# Patient Record
Sex: MALE | Race: OTHER | NOT HISPANIC OR LATINO | URBAN - METROPOLITAN AREA
[De-identification: names, ages, dates, MRNs, and addresses within clinical notes are randomized per-mention and may not be internally consistent; named-entity substitution may affect disease eponyms.]

---

## 2019-03-15 PROBLEM — Z00.00 ENCOUNTER FOR PREVENTIVE HEALTH EXAMINATION: Status: ACTIVE | Noted: 2019-03-15

## 2019-03-18 ENCOUNTER — OUTPATIENT (OUTPATIENT)
Dept: OUTPATIENT SERVICES | Facility: HOSPITAL | Age: 64
LOS: 1 days | Discharge: HOME | End: 2019-03-18

## 2019-03-18 ENCOUNTER — APPOINTMENT (OUTPATIENT)
Dept: HEMATOLOGY ONCOLOGY | Facility: CLINIC | Age: 64
End: 2019-03-18

## 2019-03-18 DIAGNOSIS — C90.00 MULTIPLE MYELOMA NOT HAVING ACHIEVED REMISSION: ICD-10-CM

## 2019-04-12 ENCOUNTER — APPOINTMENT (OUTPATIENT)
Dept: HEMATOLOGY ONCOLOGY | Facility: CLINIC | Age: 64
End: 2019-04-12
Payer: COMMERCIAL

## 2019-04-12 ENCOUNTER — RESULT REVIEW (OUTPATIENT)
Age: 64
End: 2019-04-12

## 2019-04-12 ENCOUNTER — APPOINTMENT (OUTPATIENT)
Dept: HEMATOLOGY ONCOLOGY | Facility: CLINIC | Age: 64
End: 2019-04-12

## 2019-04-12 ENCOUNTER — OUTPATIENT (OUTPATIENT)
Dept: OUTPATIENT SERVICES | Facility: HOSPITAL | Age: 64
LOS: 1 days | End: 2019-04-12
Payer: COMMERCIAL

## 2019-04-12 DIAGNOSIS — Z80.3 FAMILY HISTORY OF MALIGNANT NEOPLASM OF BREAST: ICD-10-CM

## 2019-04-12 DIAGNOSIS — Z78.9 OTHER SPECIFIED HEALTH STATUS: ICD-10-CM

## 2019-04-12 DIAGNOSIS — I10 ESSENTIAL (PRIMARY) HYPERTENSION: ICD-10-CM

## 2019-04-12 DIAGNOSIS — C90.00 MULTIPLE MYELOMA NOT HAVING ACHIEVED REMISSION: ICD-10-CM

## 2019-04-12 DIAGNOSIS — N18.9 CHRONIC KIDNEY DISEASE, UNSPECIFIED: ICD-10-CM

## 2019-04-12 LAB
BASOPHILS # BLD AUTO: 0 K/UL — SIGNIFICANT CHANGE UP (ref 0–0.2)
BASOPHILS NFR BLD AUTO: 0.1 % — SIGNIFICANT CHANGE UP (ref 0–2)
EOSINOPHIL # BLD AUTO: 0.1 K/UL — SIGNIFICANT CHANGE UP (ref 0–0.5)
EOSINOPHIL NFR BLD AUTO: 1.8 % — SIGNIFICANT CHANGE UP (ref 0–6)
HCT VFR BLD CALC: 38.1 % — LOW (ref 39–50)
HGB BLD-MCNC: 12.1 G/DL — LOW (ref 13–17)
LYMPHOCYTES # BLD AUTO: 1.5 K/UL — SIGNIFICANT CHANGE UP (ref 1–3.3)
LYMPHOCYTES # BLD AUTO: 31.5 % — SIGNIFICANT CHANGE UP (ref 13–44)
MCHC RBC-ENTMCNC: 26.2 PG — LOW (ref 27–34)
MCHC RBC-ENTMCNC: 31.8 G/DL — LOW (ref 32–36)
MCV RBC AUTO: 82.3 FL — SIGNIFICANT CHANGE UP (ref 80–100)
MONOCYTES # BLD AUTO: 0.6 K/UL — SIGNIFICANT CHANGE UP (ref 0–0.9)
MONOCYTES NFR BLD AUTO: 12.6 % — SIGNIFICANT CHANGE UP (ref 2–14)
NEUTROPHILS # BLD AUTO: 2.5 K/UL — SIGNIFICANT CHANGE UP (ref 1.8–7.4)
NEUTROPHILS NFR BLD AUTO: 54 % — SIGNIFICANT CHANGE UP (ref 43–77)
PLATELET # BLD AUTO: 159 K/UL — SIGNIFICANT CHANGE UP (ref 150–400)
RBC # BLD: 4.63 M/UL — SIGNIFICANT CHANGE UP (ref 4.2–5.8)
RBC # FLD: 13.8 % — SIGNIFICANT CHANGE UP (ref 10.3–14.5)
WBC # BLD: 4.7 K/UL — SIGNIFICANT CHANGE UP (ref 3.8–10.5)
WBC # FLD AUTO: 4.7 K/UL — SIGNIFICANT CHANGE UP (ref 3.8–10.5)

## 2019-04-12 PROCEDURE — 88360 TUMOR IMMUNOHISTOCHEM/MANUAL: CPT | Mod: 26

## 2019-04-12 PROCEDURE — 88271 CYTOGENETICS DNA PROBE: CPT

## 2019-04-12 PROCEDURE — 88313 SPECIAL STAINS GROUP 2: CPT

## 2019-04-12 PROCEDURE — 85097 BONE MARROW INTERPRETATION: CPT

## 2019-04-12 PROCEDURE — 88285 CHROMOSOME COUNT ADDITIONAL: CPT

## 2019-04-12 PROCEDURE — 88305 TISSUE EXAM BY PATHOLOGIST: CPT | Mod: 26

## 2019-04-12 PROCEDURE — 88313 SPECIAL STAINS GROUP 2: CPT | Mod: 26

## 2019-04-12 PROCEDURE — 88360 TUMOR IMMUNOHISTOCHEM/MANUAL: CPT

## 2019-04-12 PROCEDURE — 88305 TISSUE EXAM BY PATHOLOGIST: CPT

## 2019-04-12 PROCEDURE — 88187 FLOWCYTOMETRY/READ 2-8: CPT

## 2019-04-12 PROCEDURE — 88264 CHROMOSOME ANALYSIS 20-25: CPT

## 2019-04-12 PROCEDURE — 88237 TISSUE CULTURE BONE MARROW: CPT

## 2019-04-12 PROCEDURE — 88291 CYTO/MOLECULAR REPORT: CPT

## 2019-04-12 PROCEDURE — 38222 DX BONE MARROW BX & ASPIR: CPT | Mod: RT

## 2019-04-12 PROCEDURE — 99205 OFFICE O/P NEW HI 60 MIN: CPT | Mod: 25

## 2019-04-12 PROCEDURE — 88275 CYTOGENETICS 100-300: CPT

## 2019-04-12 PROCEDURE — 88280 CHROMOSOME KARYOTYPE STUDY: CPT

## 2019-04-12 PROCEDURE — 88185 FLOWCYTOMETRY/TC ADD-ON: CPT

## 2019-04-12 PROCEDURE — 88184 FLOWCYTOMETRY/ TC 1 MARKER: CPT

## 2019-04-12 PROCEDURE — 87205 SMEAR GRAM STAIN: CPT

## 2019-04-15 LAB — TM INTERPRETATION: SIGNIFICANT CHANGE UP

## 2019-04-16 LAB — HEMATOPATHOLOGY REPORT: SIGNIFICANT CHANGE UP

## 2019-04-19 ENCOUNTER — APPOINTMENT (OUTPATIENT)
Dept: NUCLEAR MEDICINE | Facility: IMAGING CENTER | Age: 64
End: 2019-04-19

## 2019-04-19 LAB — CHROM ANALY INTERPHASE BLD FISH-IMP: SIGNIFICANT CHANGE UP

## 2019-04-25 ENCOUNTER — FORM ENCOUNTER (OUTPATIENT)
Age: 64
End: 2019-04-25

## 2019-04-26 ENCOUNTER — OUTPATIENT (OUTPATIENT)
Dept: OUTPATIENT SERVICES | Facility: HOSPITAL | Age: 64
LOS: 1 days | End: 2019-04-26
Payer: COMMERCIAL

## 2019-04-26 ENCOUNTER — APPOINTMENT (OUTPATIENT)
Dept: NUCLEAR MEDICINE | Facility: IMAGING CENTER | Age: 64
End: 2019-04-26
Payer: COMMERCIAL

## 2019-04-26 DIAGNOSIS — C90.00 MULTIPLE MYELOMA NOT HAVING ACHIEVED REMISSION: ICD-10-CM

## 2019-04-26 PROCEDURE — 78816 PET IMAGE W/CT FULL BODY: CPT

## 2019-04-26 PROCEDURE — A9552: CPT

## 2019-04-26 PROCEDURE — 78816 PET IMAGE W/CT FULL BODY: CPT | Mod: 26,PI

## 2019-04-29 LAB — CHROM ANALY OVERALL INTERP SPEC-IMP: SIGNIFICANT CHANGE UP

## 2019-04-30 ENCOUNTER — OUTPATIENT (OUTPATIENT)
Dept: OUTPATIENT SERVICES | Facility: HOSPITAL | Age: 64
LOS: 1 days | Discharge: HOME | End: 2019-04-30

## 2019-04-30 DIAGNOSIS — C90.00 MULTIPLE MYELOMA NOT HAVING ACHIEVED REMISSION: ICD-10-CM

## 2019-05-03 ENCOUNTER — APPOINTMENT (OUTPATIENT)
Dept: HEMATOLOGY ONCOLOGY | Facility: CLINIC | Age: 64
End: 2019-05-03
Payer: COMMERCIAL

## 2019-05-03 VITALS
DIASTOLIC BLOOD PRESSURE: 80 MMHG | OXYGEN SATURATION: 97 % | RESPIRATION RATE: 16 BRPM | TEMPERATURE: 98.4 F | SYSTOLIC BLOOD PRESSURE: 144 MMHG | HEART RATE: 50 BPM | WEIGHT: 149.47 LBS

## 2019-05-03 DIAGNOSIS — C90.00 MULTIPLE MYELOMA NOT HAVING ACHIEVED REMISSION: ICD-10-CM

## 2019-05-03 PROCEDURE — 99215 OFFICE O/P EST HI 40 MIN: CPT

## 2019-08-05 PROBLEM — I10 HTN (HYPERTENSION), BENIGN: Status: ACTIVE | Noted: 2019-08-05

## 2019-08-05 PROBLEM — C90.00 MULTIPLE MYELOMA: Status: ACTIVE | Noted: 2019-04-11

## 2019-08-05 PROBLEM — Z78.9 DOES NOT USE TOBACCO: Status: ACTIVE | Noted: 2019-08-05

## 2019-08-05 PROBLEM — N18.9 CHRONIC KIDNEY DISEASE: Status: ACTIVE | Noted: 2019-08-05

## 2019-08-05 PROBLEM — Z80.3 FAMILY HISTORY OF MALIGNANT NEOPLASM OF BREAST: Status: ACTIVE | Noted: 2019-08-05

## 2019-08-05 RX ORDER — LISINOPRIL 20 MG/1
TABLET ORAL
Refills: 0 | Status: ACTIVE | COMMUNITY

## 2019-08-05 RX ORDER — MIRTAZAPINE 30 MG/1
TABLET, FILM COATED ORAL
Refills: 0 | Status: ACTIVE | COMMUNITY

## 2019-08-05 NOTE — ADDENDUM
[FreeTextEntry1] : Documented by Asuncion Michaels acting as a scribe for Dr. Menezes on 5/3/2019\par \par All medical record entries made by the Scribe were at my, Dr. Menezes's, direction and\par personally dictated by me on 5/3/2019 I have reviewed the chart and agree that the record\par accurately reflects my personal performance of the history, physical exam, procedure and imaging.

## 2019-08-05 NOTE — REVIEW OF SYSTEMS
[Fatigue] : fatigue [Recent Change In Weight] : ~T recent weight change [Negative] : Allergic/Immunologic [FreeTextEntry2] : -20lbs

## 2019-08-05 NOTE — ADDENDUM
[FreeTextEntry1] : Documented by Asuncion Michaels acting as a scribe for Dr. Menezes on 4/12/2019\par \par All medical record entries made by the Scribe were at my, Dr. Menezes's, direction and\par personally dictated by me on 4/12/2019 I have reviewed the chart and agree that the record\par accurately reflects my personal performance of the history, physical exam, procedure and imaging.

## 2019-08-05 NOTE — PROCEDURE
[Bone Marrow Aspiration] : bone marrow aspiration  [Bone Marrow Biopsy] : bone marrow biopsy [Patient] : the patient [Verbal Consent Obtained] : verbal consent was obtained prior to the procedure [Patient identification verified] : patient identification verified [Procedure verified and consent obtained] : procedure verified and consent obtained [Laterality verified and correct site marked] : laterality verified and correct site marked [Right] : site: right [Correct positioning] : correct positioning [Correct implant and/ or special equipment obtained] : correct impact and/ or special equipment obtained [Prone] : prone [Superior iliac spine was identified] : the superior iliac spine was identified. [The right posterior iliac crest was prepped with betadine and draped, using sterile technique.] : The right posterior iliac crest was prepped with betadine and draped, using sterile technique. [Aspirate] : aspirate [Cytogenetics] : cytogenetics [FISH] : FISH [Biopsy] : biopsy [Flow Cytometry] : flow cytometry [] : The patient was instructed to remove the bandage the following AM. The patient may bathe. Acetaminophen may be taken for discomfort, as per package directions.If there are any other problems, the patient was instructed to call the office. The patient verbalized understanding, and is aware of the office contact numbers. [FreeTextEntry1] : multiple myeloma

## 2019-08-05 NOTE — ASSESSMENT
[FreeTextEntry1] : Mr. Glynn is a 62 y/o male who presents for an evaluation of multiple myeloma.  Lab work completed as an outpatient last month- Immunofixation revealed one IgG Kappa band and one weak free Kappa light chain identified but with a IgG level of 3166, k/l FLC ratio of 114.  \par He has a history of CKD stage III, with a worsening creatinine, last 2.16.  \par I have had an extensive discussion with him and his wife.  I have explained he likely has underlying multiple myeloma (his k/l FLC ratio of >100 is diagnostic of disease).  I have explained that diagnostic testing would include a bone marrow biopsy along with metastatic skeletal survey or PET/CT to evaluate for underlying bone lesions.  \par I have has a long discussion regarding diagnosis/prognosis.  Explained that the disease is incurable but treatable.  Treatment consists of chemotherapy.  \par Bone marrow biopsy will be completed today.  PET/CT to be scheduled.  He will follow up after the above to discuss further treatment options as he has doubts about the diagnosis.  \par Check CMP today, repeat SPEP/JERROD, quantitative immunoglobulins/SFLC.\par

## 2019-08-05 NOTE — ASSESSMENT
[FreeTextEntry1] : Mr. Glynn is a 62 y/o male with IgG kappa multiple myeloma.  Bone marrow biopsy confirmed the diagnosis, normal karyotype, FISH 4 copies of CCND.  PET/CT revealed multiple bone lesions, his k/l ratio >100. \par I have had an extensive discussion with the patient and his wife regarding the diagnosis.  Explained that it is incurable and that treatment would entail chemotherapy.  Recommend to begin treatment with velcade/cytoxan/decadron, 3 weeks on/1 week off due to his renal failure.  Risks including but not limited to myelosuppression, infection, peripheral neuropathy, zoster reactivation discussed.  \par I have also recommended monthly bisphosphonate treatment with zometa/aredia with a dental evaluation prior. \par He appears very anxious regarding the diagnosis and his wife appears to have distrust over the results.  \par Copies of the studies provided to the patient.  Strongly encouraged a second opinion evaluation.  Risks of no treatment include worsening bone disease, fractures, renal failure requiring dialysis, anemia, transfusions, death discussed. \par Patient states he is mentally unprepared for chemotherapy, needs time to think about it. \par He will discuss with this family further and will reach out if he decides to seek treatment here or continue for a second opinion (which he is considering).  \par All questions/concerns answered.

## 2019-08-05 NOTE — REVIEW OF SYSTEMS
[Fatigue] : fatigue [Muscle Pain] : muscle pain [Recent Change In Weight] : ~T recent weight change [Negative] : Allergic/Immunologic [FreeTextEntry9] : bone pains [FreeTextEntry2] : -20lbs

## 2019-08-05 NOTE — HISTORY OF PRESENT ILLNESS
[de-identified] : Patient presents for a follow up appointment today to review his results, he is accompanied by his wife.  Today he complains of pain in his left arm and left leg. Reports fatigue and generalized weakness. Appetite is poor.\par \par Bone marrow biopsy from 4/12/19 hypercellular (50to 60%) with extensive plasma cell infiltrate (aggregates in some areas), myeloid and erythroid maturation present, megakaryocytes adequate in number with unremarkable morphology.  [place] red stain is negative for amyloid.\par Normal male karyotype\par ABNORMAL FISH MULTIPLE MYELOMA PANEL- Three copies of CCND1 detected (4%)\par \par \par PET Scan - Widespread lytic, expansile lesions in the axial and appendicular skeleton. Lytic lesions with extraosseous soft tissue in the thoracolumbar spine, most prominent at L2. Small FDG avid focus left posterior proximal femur without evidence of cortical disruption, however lesion may be at risk for pathologic fracture. FDG avid supraclavicular, mediastinal and hilar lymph nodes, indeterminate. Non-FDG avid left thyroid lobe nodule.

## 2019-08-05 NOTE — HISTORY OF PRESENT ILLNESS
[de-identified] : This is a 62 y/o male who presents for an evaluation of multiple myeloma. He has a past medical history of stage III chronic kidney disease and hypertension (currently on Lisinopril). Patient notes that he lost 20 lbs in the span of 6 months and recommended that his PCP screen for cancer.  Labs completed in March revealed Hg 11.6, creatinine 2.16, calcium 10.2, total protein 8.5, SPEP/JERROD with IgG kappa, free kappa light chain, M spike 2.2.  IgG 3166, kappa light chain 299, k/l FLC ratio 114.  Today he is doing well, denies any significant symptoms - no bone pains, no fractures. Overall he feels fatigued. Patient was on Remeron for some time, as his doctor associated his weight loss with stress/depression. His appetite is stable, weight continues to trend down.  He has no headaches, no visual changes, no chest pain, no SOB, no abdominal c/o, no n/v/d. \par \par

## 2019-08-15 ENCOUNTER — OUTPATIENT (OUTPATIENT)
Dept: OUTPATIENT SERVICES | Facility: HOSPITAL | Age: 64
LOS: 1 days | Discharge: HOME | End: 2019-08-15

## 2019-08-15 DIAGNOSIS — C90.00 MULTIPLE MYELOMA NOT HAVING ACHIEVED REMISSION: ICD-10-CM

## 2019-08-19 ENCOUNTER — APPOINTMENT (OUTPATIENT)
Dept: HEMATOLOGY ONCOLOGY | Facility: CLINIC | Age: 64
End: 2019-08-19

## 2020-04-12 LAB
ALBUMIN MFR SERPL ELPH: 46 %
ALBUMIN SERPL ELPH-MCNC: 3.8 G/DL
ALBUMIN SERPL-MCNC: 4 G/DL
ALBUMIN/GLOB SERPL: 0.9 RATIO
ALP BLD-CCNC: 94 U/L
ALPHA1 GLOB MFR SERPL ELPH: 4.1 %
ALPHA1 GLOB SERPL ELPH-MCNC: 0.4 G/DL
ALPHA2 GLOB MFR SERPL ELPH: 9.3 %
ALPHA2 GLOB SERPL ELPH-MCNC: 0.8 G/DL
ALT SERPL-CCNC: 13 U/L
ANION GAP SERPL CALC-SCNC: 10 MMOL/L
AST SERPL-CCNC: 24 U/L
B-GLOBULIN MFR SERPL ELPH: 8.1 %
B-GLOBULIN SERPL ELPH-MCNC: 0.7 G/DL
B2 MICROGLOB SERPL-MCNC: 14 MG/L
BILIRUB SERPL-MCNC: 0.3 MG/DL
BUN SERPL-MCNC: 13 MG/DL
CALCIUM SERPL-MCNC: 9.9 MG/DL
CHLORIDE SERPL-SCNC: 107 MMOL/L
CO2 SERPL-SCNC: 23 MMOL/L
CREAT SERPL-MCNC: 2.14 MG/DL
DEPRECATED KAPPA LC FREE/LAMBDA SER: 138.26 RATIO
GAMMA GLOB FLD ELPH-MCNC: 2.8 G/DL
GAMMA GLOB MFR SERPL ELPH: 32.5 %
GLUCOSE SERPL-MCNC: 84 MG/DL
IGA SER QL IEP: 36 MG/DL
IGG SER QL IEP: 3031 MG/DL
IGM SER QL IEP: 12 MG/DL
INTERPRETATION SERPL IEP-IMP: NORMAL
KAPPA LC CSF-MCNC: 2.01 MG/DL
KAPPA LC SERPL-MCNC: 277.9 MG/DL
M PROTEIN MFR SERPL ELPH: NORMAL
M PROTEIN SPEC IFE-MCNC: NORMAL
MONOCLON BAND OBS SERPL: NORMAL
POTASSIUM SERPL-SCNC: 4.4 MMOL/L
PROT SERPL-MCNC: 8.2 G/DL
PROT SERPL-MCNC: 8.6 G/DL
PROT SERPL-MCNC: 8.6 G/DL
SODIUM SERPL-SCNC: 140 MMOL/L